# Patient Record
Sex: MALE | Race: OTHER | URBAN - METROPOLITAN AREA
[De-identification: names, ages, dates, MRNs, and addresses within clinical notes are randomized per-mention and may not be internally consistent; named-entity substitution may affect disease eponyms.]

---

## 2017-10-30 ENCOUNTER — OFFICE VISIT (OUTPATIENT)
Dept: FAMILY MEDICINE CLINIC | Age: 42
End: 2017-10-30

## 2017-10-30 VITALS
SYSTOLIC BLOOD PRESSURE: 156 MMHG | HEART RATE: 87 BPM | HEIGHT: 70 IN | BODY MASS INDEX: 29.49 KG/M2 | WEIGHT: 206 LBS | DIASTOLIC BLOOD PRESSURE: 100 MMHG | TEMPERATURE: 98.2 F

## 2017-10-30 DIAGNOSIS — F32.A ANXIETY AND DEPRESSION: ICD-10-CM

## 2017-10-30 DIAGNOSIS — F41.9 ANXIETY AND DEPRESSION: ICD-10-CM

## 2017-10-30 DIAGNOSIS — I10 ESSENTIAL HYPERTENSION: Primary | ICD-10-CM

## 2017-10-30 RX ORDER — LISINOPRIL AND HYDROCHLOROTHIAZIDE 10; 12.5 MG/1; MG/1
1 TABLET ORAL DAILY
Qty: 90 TAB | Refills: 1 | Status: SHIPPED | OUTPATIENT
Start: 2017-10-30 | End: 2017-11-29 | Stop reason: DRUGHIGH

## 2017-10-30 RX ORDER — CITALOPRAM 10 MG/1
20 TABLET ORAL DAILY
Qty: 30 TAB | Refills: 2 | Status: SHIPPED | OUTPATIENT
Start: 2017-10-30 | End: 2017-11-29 | Stop reason: SDUPTHER

## 2017-10-30 NOTE — PROGRESS NOTES
Reviewed AVS, prescription and pharmacy location with patient. GoodRx coupons printed and given to patient for his medications. Encourage patient on stop smoking. Pt aware that he must return to see the health provider in 4 weeks for follow up, patient to also have an appt. With [facility] counselor, patient aware that he will receive a call from our office later to schedule that appt. With the counselor. Patient verbalized understanding . No questions or concern from patient at this time.  Lyle Molina RN

## 2017-10-30 NOTE — PROGRESS NOTES
Coordination of Care  1. Have you been to the ER, urgent care clinic since your last visit? Hospitalized since your last visit? No    2. Have you seen or consulted any other health care providers outside of the [address] [place] [address] since your last visit? Include any pap smears or colon screening. No    Medications  Does the patient need refills? YES    Learning Assessment Complete?  yes

## 2017-10-30 NOTE — PROGRESS NOTES
Charis Marr is a 38 y.o. male    Issues discussed today include:    1) High BP:  Was referred here from longterm for high BP. Has to spend 4 days there for driving without a license, going on Sat and Sun. Pt has already done first weekend and plans to complete final weekend in 5 days. Pt is very anxious bc he is [facility] and he knows immigration can come at any time and if he's found they can deport him. He has everything here - wife, children, car, job and is nervous he may loose it all. Denies HA, dizziness, CP, SOB, palpitations. 2) Anxiety and depression:  See PHQ9 score below. Patient open to counseling and medication. Previously on celexa in 2014/2015 - pt denies side effects then. PHQ over the last two weeks 10/30/2017   Little interest or pleasure in doing things Nearly every day   Feeling down, depressed or hopeless Nearly every day   Total Score PHQ 2 6   Trouble falling or staying asleep, or sleeping too much Several days   Feeling tired or having little energy Several days   Poor appetite or overeating Several days   Feeling bad about yourself - or that you are a failure or have let yourself or your family down Several days   Trouble concentrating on things such as school, work, reading or watching TV Several days   Moving or speaking so slowly that other people could have noticed; or the opposite being so fidgety that others notice Several days   Thoughts of being better off dead, or hurting yourself in some way Not at all   PHQ 9 Score 12       Data reviewed or ordered today:       Other problems include:  Patient Active Problem List   Diagnosis Code    Anxiety and depression F41.8    HTN (hypertension) I10       Medications:  Current Outpatient Prescriptions   Medication Sig Dispense Refill    lisinopril-hydroCHLOROthiazide (PRINZIDE, ZESTORETIC) 10-12.5 mg per tablet Take 1 Tab by mouth daily. 90 Tab 1    citalopram (CELEXA) 10 mg tablet Take 2 Tabs by mouth daily.  30 Tab 2 Allergies: Allergies   Allergen Reactions    Ibuprofen Hives and Swelling       LMP:  No LMP for male patient. Social History     Social History    Marital status:      Spouse name: N/A    Number of children: N/A    Years of education: N/A     Occupational History    Not on file. Social History Main Topics    Smoking status: Current Every Day Smoker     Types: Cigarettes    Smokeless tobacco: Never Used      Comment: 6 or 7 cigarettes a day x 2 years    Alcohol use Yes      Comment: social \"only at [employer]" - beer    Drug use: No    Sexual activity: Not on file     Other Topics Concern    Not on file     Social History Narrative       Family History   Problem Relation Age of Onset    Diabetes Neg Hx     Heart Disease Neg Hx     Psychiatric Disorder Neg Hx        ROS:   Chest Pain:  No  SOB:  No      Physical Exam  Visit Vitals    BP (!) 156/100 (BP 1 Location: Right arm, BP Patient Position: Sitting)    Pulse 87    Temp 98.2 °F (36.8 °C) (Oral)    Ht 5' 9.69\" (1.77 m)    Wt 206 lb (93.4 kg)    BMI 29.83 kg/m2     BP Readings from Last 3 Encounters:   10/30/17 (!) 156/100   03/10/15 118/81   01/13/15 130/84     Constitutional: Appears anxious, Vitals noted  Psychiatric:  Affect normal, Alert and Oriented to person/place/time  Eyes:  Conjunctiva clear, no drainage  ENT:  External ears and nose normal, Teeth and gums appear healthy, Mucous membranes moist  Neck:  General inspection normal. Supple. No carotid bruits. Heart:  Normal HR, Normal S1 and S2,  Regular rhythm. No murmurs, rubs or gallops. Lungs:  Clear to auscultation, good respiratory effort, no wheezes, rales or rhonchi  Extremities:  Without edema, good peripheral pulses  Skin:  Warm to palpation, without rashes        Assessment/Plan:      ICD-10-CM ICD-9-CM    1. Essential hypertension I10 401.9 lisinopril-hydroCHLOROthiazide (PRINZIDE, ZESTORETIC) 10-12.5 mg per tablet   2.  Anxiety and depression F41.8 300.00 citalopram (CELEXA) 10 mg tablet     311      Resume prior BP and mood medications  Start with low dose celexa, appt with counselor  He declined labs today, will plan to do at follow up  Close follow up      Follow-up Disposition:  Return in about 4 weeks (around 11/27/2017) for Provider for HTN and depression follow up, soonest available appt with Thalia Obando. Greater than 50% of this 25 minute appt was spent in counseling patient about: giving reassurance, discussing options for depression and anxiety treatment, introducing idea of nurse navigator and counselor to help he and his family get through a stressful time.         Robert Elena MD  [address] [place] [address]

## 2017-11-24 ENCOUNTER — PATIENT OUTREACH (OUTPATIENT)
Dept: FAMILY MEDICINE CLINIC | Age: 42
End: 2017-11-24

## 2017-11-24 NOTE — PROGRESS NOTES
Goals Addressed      Supportive resources in place to maintain patient in the community (ie. Home Health, DME equipment, refer to, medication assistant plan, etc.)                  Maribell Lezama states that he serve his last day of retention at the [facility] for driving without a license. He feels less stressed and anxiiuos at this time. Maribell Lezama agrees that speaking with Melly Castrejon will help him/ Appointment with Melly Castrejon is 12/12/17. NN offered support and provided contact information with hours of operation. Ongoing goal. IM

## 2017-11-29 ENCOUNTER — OFFICE VISIT (OUTPATIENT)
Dept: FAMILY MEDICINE CLINIC | Age: 42
End: 2017-11-29

## 2017-11-29 VITALS
HEART RATE: 94 BPM | SYSTOLIC BLOOD PRESSURE: 110 MMHG | BODY MASS INDEX: 29.54 KG/M2 | TEMPERATURE: 98 F | DIASTOLIC BLOOD PRESSURE: 68 MMHG | WEIGHT: 204 LBS

## 2017-11-29 DIAGNOSIS — F41.9 ANXIETY AND DEPRESSION: ICD-10-CM

## 2017-11-29 DIAGNOSIS — I10 ESSENTIAL HYPERTENSION: ICD-10-CM

## 2017-11-29 DIAGNOSIS — F32.A ANXIETY AND DEPRESSION: ICD-10-CM

## 2017-11-29 RX ORDER — HYDROCHLOROTHIAZIDE 12.5 MG/1
12.5 TABLET ORAL DAILY
Qty: 30 TAB | Refills: 5 | Status: SHIPPED | OUTPATIENT
Start: 2017-11-29

## 2017-11-29 RX ORDER — CITALOPRAM 10 MG/1
10 TABLET ORAL DAILY
Qty: 30 TAB | Refills: 1 | Status: SHIPPED | OUTPATIENT
Start: 2017-11-29

## 2017-11-29 NOTE — PROGRESS NOTES
AVS printed and reviewed. E scripts discussed. Message routed to provider re Celexa refill x 1 vs f/u 4 months recommended.

## 2017-11-29 NOTE — PROGRESS NOTES
Coordination of Care  1. Have you been to the ER, urgent care clinic since your last visit? Hospitalized since your last visit? No    2. Have you seen or consulted any other health care providers outside of the [address] [place] [address] since your last visit? Include any pap smears or colon screening. No    Medications  Does the patient need refills? YES    Learning Assessment Complete?  yes

## 2017-12-08 NOTE — PROGRESS NOTES
Joslyn Domingo is a 38 y.o. male    Issues discussed today include:    1) Anxiety and depression: Thinks celexa 20mg was causing side effects - nausea and lightheadedness. So now taking 10mg daily and doing fine. Mood improved, but still worries a lot. Wants a refill. Denies SI/HI. Is interested in meeting with the counselor. Data reviewed or ordered today:       Other problems include:  Patient Active Problem List   Diagnosis Code    Anxiety and depression F41.8    HTN (hypertension) I10       Medications:  Current Outpatient Prescriptions   Medication Sig Dispense Refill    citalopram (CELEXA) 10 mg tablet Take 1 Tab by mouth daily. [place] 1 tableta mariah vez al radha para depresion/anxiedad 30 Tab 1    hydroCHLOROthiazide (HYDRODIURIL) 12.5 mg tablet Take 1 Tab by mouth daily. [place] 1 tableta mariah vez al radha para presion gail 30 Tab 5       Allergies: Allergies   Allergen Reactions    Ibuprofen Hives and Swelling       LMP:  No LMP for male patient. Social History     Social History    Marital status:      Spouse name: N/A    Number of children: N/A    Years of education: N/A     Occupational History    Not on file.      Social History Main Topics    Smoking status: Current Every Day Smoker     Types: Cigarettes    Smokeless tobacco: Never Used      Comment: 6 or 7 cigarettes a day     Alcohol use Yes      Comment: socially    Drug use: No    Sexual activity: Not on file     Other Topics Concern    Not on file     Social History Narrative       Family History   Problem Relation Age of Onset    Diabetes Neg Hx     Heart Disease Neg Hx     Psychiatric Disorder Neg Hx          Physical Exam   Visit Vitals    /68 (BP 1 Location: Right arm, BP Patient Position: Sitting)    Pulse 94    Temp 98 °F (36.7 °C) (Oral)    Wt 204 lb (92.5 kg)    BMI 29.54 kg/m2      BP Readings from Last 3 Encounters:   11/29/17 110/68   10/30/17 (!) 156/100   03/10/15 118/81     Constitutional: Appears well,  No acute distress, Vitals noted  Psychiatric:  Affect normal, Alert and Oriented to person/place/time  Eyes:  Conjunctiva clear, no drainage  ENT:  External ears and nose normal, Mucous membranes moist  Neck:  General inspection normal. Supple. Heart:  Normal HR, Normal S1 and S2,  Regular rhythm. No murmurs, rubs or gallops. Lungs:  Clear to auscultation, good respiratory effort, no wheezes, rales or rhonchi  Extremities: Without edema, good peripheral pulses  Skin:  Warm to palpation, without rashes      Assessment/Plan:      ICD-10-CM ICD-9-CM    1. Anxiety and depression F41.8 300.00 citalopram (CELEXA) 10 mg tablet     311    2. Essential hypertension I10 401.9        I suspect his lightheadedness may be 2/2 BP med, BP low today after resuming prior HTN medication last taken 2 yrs ago  Will stop prinzide and start HCTZ 12.5mg daily in stead  Continue lower celexa dose of 10mg daily  Make appt with counselor    Follow-up Disposition:  Return in about 4 months (around 3/29/2018) for BP follow up appt.       MD Halima Bee, Dequan Vela

## 2017-12-12 ENCOUNTER — DOCUMENTATION ONLY (OUTPATIENT)
Dept: FAMILY MEDICINE CLINIC | Age: 42
End: 2017-12-12

## 2017-12-12 NOTE — PROGRESS NOTES
No show. Will be given opportunity to re-schedule. Has been seen previously. Last appointment with therapist was in June 2014.

## 2017-12-14 ENCOUNTER — PATIENT OUTREACH (OUTPATIENT)
Dept: FAMILY MEDICINE CLINIC | Age: 42
End: 2017-12-14

## 2017-12-19 ENCOUNTER — TELEPHONE (OUTPATIENT)
Dept: FAMILY MEDICINE CLINIC | Age: 42
End: 2017-12-19

## 2017-12-19 NOTE — TELEPHONE ENCOUNTER
Per Nathan Frausto request, I tried to contact the patient to reschedule the no show appointment from 12/12/17. I left a voicemail for patient to contact the office for rescheduling.     Phone call routed to [facility] - [facility], Nurse Navigator, Marcia Taylor, [phone], Catalino Jarvis MD & Toña Jeong MD.      Soraya Munoz